# Patient Record
Sex: FEMALE | Race: WHITE | NOT HISPANIC OR LATINO | Employment: OTHER | URBAN - METROPOLITAN AREA
[De-identification: names, ages, dates, MRNs, and addresses within clinical notes are randomized per-mention and may not be internally consistent; named-entity substitution may affect disease eponyms.]

---

## 2018-01-09 ENCOUNTER — INFUSION (OUTPATIENT)
Dept: INFUSION THERAPY | Facility: HOSPITAL | Age: 63
End: 2018-01-09
Attending: OBSTETRICS & GYNECOLOGY
Payer: MEDICARE

## 2018-01-09 VITALS
RESPIRATION RATE: 16 BRPM | SYSTOLIC BLOOD PRESSURE: 133 MMHG | DIASTOLIC BLOOD PRESSURE: 91 MMHG | OXYGEN SATURATION: 99 % | HEART RATE: 74 BPM | TEMPERATURE: 98 F

## 2018-01-09 DIAGNOSIS — C56.9 MALIGNANT NEOPLASM OF OVARY, UNSPECIFIED LATERALITY: Primary | ICD-10-CM

## 2018-01-09 LAB
ALBUMIN SERPL BCP-MCNC: 4.3 G/DL
ALP SERPL-CCNC: 94 U/L
ALT SERPL W/O P-5'-P-CCNC: 32 U/L
ANION GAP SERPL CALC-SCNC: 14 MMOL/L
AST SERPL-CCNC: 27 U/L
BASOPHILS # BLD AUTO: 0.04 K/UL
BASOPHILS NFR BLD: 0.6 %
BILIRUB SERPL-MCNC: 0.6 MG/DL
BUN SERPL-MCNC: 12 MG/DL
CALCIUM SERPL-MCNC: 9.1 MG/DL
CANCER AG125 SERPL-ACNC: 5430 U/ML
CHLORIDE SERPL-SCNC: 98 MMOL/L
CO2 SERPL-SCNC: 27 MMOL/L
CREAT SERPL-MCNC: 0.67 MG/DL
DIFFERENTIAL METHOD: ABNORMAL
EOSINOPHIL # BLD AUTO: 0.1 K/UL
EOSINOPHIL NFR BLD: 1.5 %
ERYTHROCYTE [DISTWIDTH] IN BLOOD BY AUTOMATED COUNT: 13.3 %
EST. GFR  (AFRICAN AMERICAN): >60 ML/MIN/1.73 M^2
EST. GFR  (NON AFRICAN AMERICAN): >60 ML/MIN/1.73 M^2
GLUCOSE SERPL-MCNC: 98 MG/DL
HCT VFR BLD AUTO: 44.5 %
HGB BLD-MCNC: 14.5 G/DL
LYMPHOCYTES # BLD AUTO: 1.2 K/UL
LYMPHOCYTES NFR BLD: 17.5 %
MCH RBC QN AUTO: 30.9 PG
MCHC RBC AUTO-ENTMCNC: 32.6 G/DL
MCV RBC AUTO: 95 FL
MONOCYTES # BLD AUTO: 0.6 K/UL
MONOCYTES NFR BLD: 9.1 %
NEUTROPHILS # BLD AUTO: 4.8 K/UL
NEUTROPHILS NFR BLD: 71.3 %
NRBC BLD-RTO: 0 /100 WBC
PLATELET # BLD AUTO: 251 K/UL
PMV BLD AUTO: 9.8 FL
POTASSIUM SERPL-SCNC: 3.7 MMOL/L
PROT SERPL-MCNC: 7.4 G/DL
RBC # BLD AUTO: 4.69 M/UL
SODIUM SERPL-SCNC: 139 MMOL/L
WBC # BLD AUTO: 6.74 K/UL

## 2018-01-09 PROCEDURE — 80053 COMPREHEN METABOLIC PANEL: CPT

## 2018-01-09 PROCEDURE — 85025 COMPLETE CBC W/AUTO DIFF WBC: CPT | Mod: PN

## 2018-01-09 PROCEDURE — 85025 COMPLETE CBC W/AUTO DIFF WBC: CPT

## 2018-01-09 PROCEDURE — 86304 IMMUNOASSAY TUMOR CA 125: CPT

## 2018-01-09 PROCEDURE — 96523 IRRIG DRUG DELIVERY DEVICE: CPT | Mod: PN

## 2018-01-09 PROCEDURE — 25000003 PHARM REV CODE 250: Mod: PN | Performed by: OBSTETRICS & GYNECOLOGY

## 2018-01-09 PROCEDURE — A4216 STERILE WATER/SALINE, 10 ML: HCPCS | Mod: PN | Performed by: OBSTETRICS & GYNECOLOGY

## 2018-01-09 PROCEDURE — 86304 IMMUNOASSAY TUMOR CA 125: CPT | Mod: PN

## 2018-01-09 PROCEDURE — 80053 COMPREHEN METABOLIC PANEL: CPT | Mod: PN

## 2018-01-09 RX ORDER — SODIUM CHLORIDE 0.9 % (FLUSH) 0.9 %
10 SYRINGE (ML) INJECTION
Status: DISCONTINUED | OUTPATIENT
Start: 2018-01-09 | End: 2018-01-09 | Stop reason: HOSPADM

## 2018-01-09 RX ADMIN — SODIUM CHLORIDE, PRESERVATIVE FREE 10 ML: 5 INJECTION INTRAVENOUS at 04:01

## 2018-01-11 NOTE — PROCEDURES
Procedures by Abbi Lyons RN at  9/15/2016  4:12 PM      Author:  Abbi Lyons RN Service:  Oncology-Medical Author Type:  Registered Nurse     Filed:  9/15/2016  4:18 PM Date of Service:  9/15/2016  4:12 PM Status:  Signed     :  Abbi Lyons RN (Registered Nurse)         Procedure Orders:       1  Insert PICC line [id number] ordered by Hamida Antunez MD at 09/15/16 1031                    Insert PICC line  Date/Time: 9/15/2016 4:14 PM  Performed by: Enma Duncan by: Tyson Brantley     Patient location:  Bedside  Other Assisting Provider:  No    Consent:     Consent obtained:  Written    Consent given by:  Patient    Procedural risks discussed: consent obtained by physician  [place] protocol:     Procedure explained and questions answered to patient or proxy's satisfaction: yes      Relevant documents present and verified: yes      Test results available and properly labeled: yes       Imaging studies available: N/A  Required blood products, implants, devices, and special equipment available: yes      Site/side marked: yes      Immediately prior to procedure, a time out was called: yes       Patient identity confirmed:  Verbally with patient, arm band, provided demographic data and hospital-assigned identification number  Pre-procedure details:     Hand hygiene: Hand hygiene performed prior to insertion      Sterile barrier technique: All elements of maximal sterile technique followed      Skin preparation:  ChloraPrep    Skin preparation agent: Skin preparation agent completely dried prior to procedure    Indications:     PICC line indications: total parenteral nutrition    Anesthesia (see MAR for exact dosages):      Anesthesia method:  Local infiltration    Local anesthetic:  Lidocaine 1% w/o epi  Procedure details:     Location:  Basilic    Vessel type: vein      Laterality:  Right    Approach: percutaneous technique used      Patient position:  Flat    Procedural supplies:  Double lumen    Catheter size:  5 Fr    Landmarks identified: yes      Ultrasound guidance: yes      Sterile ultrasound techniques: Sterile gel and sterile probe covers were used      Number of attempts:  1    Successful placement: yes      Vessel of catheter tip end:  SVC    Total catheter length (cm):  42    Catheter out on skin (cm):  0    Max flow rate:  300ml/min    Arm circumference:  33cm  Post-procedure details:     Post-procedure:  Dressing applied and securement device placed (CHG dressing)    Assessment:  Blood return through all ports and free fluid flow ([place] 3CG)    Post-procedure complications: none      Patient tolerance of procedure:   Tolerated well, no immediate complications                     Received for:Provider  [facility]   Sep 15 2016  4:18PM [facility] Standard Time

## 2018-02-01 ENCOUNTER — SOCIAL WORK (OUTPATIENT)
Dept: HEMATOLOGY/ONCOLOGY | Facility: CLINIC | Age: 63
End: 2018-02-01

## 2018-02-01 ENCOUNTER — DOCUMENTATION ONLY (OUTPATIENT)
Dept: RADIATION ONCOLOGY | Facility: CLINIC | Age: 63
End: 2018-02-01

## 2018-02-01 ENCOUNTER — INITIAL CONSULT (OUTPATIENT)
Dept: RADIATION ONCOLOGY | Facility: CLINIC | Age: 63
End: 2018-02-01
Payer: MEDICARE

## 2018-02-01 VITALS
HEIGHT: 64 IN | WEIGHT: 201 LBS | HEART RATE: 91 BPM | DIASTOLIC BLOOD PRESSURE: 86 MMHG | TEMPERATURE: 98 F | SYSTOLIC BLOOD PRESSURE: 125 MMHG | RESPIRATION RATE: 18 BRPM | BODY MASS INDEX: 34.31 KG/M2

## 2018-02-01 DIAGNOSIS — C56.9 MALIGNANT NEOPLASM OF OVARY, UNSPECIFIED LATERALITY: Primary | ICD-10-CM

## 2018-02-01 PROCEDURE — 99205 OFFICE O/P NEW HI 60 MIN: CPT | Mod: ,,, | Performed by: RADIOLOGY

## 2018-02-01 RX ORDER — ONDANSETRON 4 MG/1
8 TABLET, ORALLY DISINTEGRATING ORAL
COMMUNITY
End: 2018-02-12 | Stop reason: SDUPTHER

## 2018-02-01 RX ORDER — HYDROCODONE BITARTRATE AND ACETAMINOPHEN 5; 325 MG/1; MG/1
1 TABLET ORAL EVERY 6 HOURS PRN
COMMUNITY

## 2018-02-01 RX ORDER — TRAMADOL HYDROCHLORIDE 50 MG/1
50 TABLET ORAL EVERY 6 HOURS PRN
COMMUNITY

## 2018-02-01 NOTE — PROGRESS NOTES
Met with patient and her  to discuss her distress rating of 10.  She stated that she was diagnosed in 2000 and has been dealing with this since then.  She suffers from pain and fatigue which impacts her ability to participate in various activities.  She does not want to pursue supportive services at this time; I provided her with my contact information in the event she changes her mind.  She was provided a flyer with the community events provided at [facility] and encouraged to attend when she feels up to it.

## 2018-02-01 NOTE — PROGRESS NOTES
Bri Kemp  [id number]  1955 2/1/2018  No referring provider defined for this encounter.    DIAGNOSIS: metastatic ovarian cancer  TREATMENT SITE(S):   1. R lateral abdominal wall  2. Entire length vagina    INTENT: PALLIATIVE    TREATMENT SETTING: RT ALONE     MODALITY: PHOTON    TECHNIQUE:  3D CONFORMAL RADIOTHERAPY (3DCRT) with DIODES    IMRT MEDICAL NECESSITY: N/A    I have personally performed treatment planning for the patient, reviewing relevant history/physical and imaging. I have defined GTV, CTV, PTV and organs at risk.     In order to accomplish this plan, I am ordering:  SIMULATION: CT SIMULATION FOR PLACEMENT OF TREATMENT FIELDS    CONTRAST: none    TO ACCOMPLISH REPRODUCIBLE POSITION: VACLOC and FULL BLADDER    DEVICES FOR BEAM SHAPING: CUSTOMIZED MLC    CUSTOMIZED BOLUS: none    IMAGING: DAILY KV/KV OBI    I have ordered a weekly physics check.    SPECIAL PHYSICS CONSULT: NO  REASON: N/A    SPECIAL TREATMENT CIRCUMSTANCE: NO  Concurrent or recent administration of chemotherapeutic agents which are known potent radiosensitizers and thus will require vigilant monitoring for exaggerated radiation toxicities.    LABS: NONE    ANTICIPATED PRESCRIPTION TO ISOCENTER: 30Gy/10frx to both sites    ENERGY: 6/23X    TREATMENT: DAILY    PHYSICIAN: Anuj Kramer Jr, MD

## 2018-02-02 ENCOUNTER — OFFICE VISIT (OUTPATIENT)
Dept: RADIATION ONCOLOGY | Facility: CLINIC | Age: 63
End: 2018-02-02
Payer: MEDICARE

## 2018-02-02 PROCEDURE — 77334 RADIATION TREATMENT AID(S): CPT | Mod: ,,, | Performed by: RADIOLOGY

## 2018-02-02 PROCEDURE — 77290 THER RAD SIMULAJ FIELD CPLX: CPT | Mod: ,,, | Performed by: RADIOLOGY

## 2018-02-05 PROCEDURE — 77334 RADIATION TREATMENT AID(S): CPT | Mod: ,,, | Performed by: RADIOLOGY

## 2018-02-05 PROCEDURE — 77263 THER RADIOLOGY TX PLNG CPLX: CPT | Mod: ,,, | Performed by: RADIOLOGY

## 2018-02-05 PROCEDURE — 77295 3-D RADIOTHERAPY PLAN: CPT | Mod: ,,, | Performed by: RADIOLOGY

## 2018-02-05 PROCEDURE — 77300 RADIATION THERAPY DOSE PLAN: CPT | Mod: ,,, | Performed by: RADIOLOGY

## 2018-02-07 DIAGNOSIS — C56.9 MALIGNANT NEOPLASM OF OVARY, UNSPECIFIED LATERALITY: Primary | ICD-10-CM

## 2018-02-07 PROCEDURE — 77427 RADIATION TX MANAGEMENT X5: CPT | Mod: ,,, | Performed by: RADIOLOGY

## 2018-02-07 RX ORDER — HYDROCODONE BITARTRATE AND ACETAMINOPHEN 5; 325 MG/1; MG/1
1 TABLET ORAL
Qty: 60 TABLET | Refills: 0 | Status: SHIPPED | OUTPATIENT
Start: 2018-02-07

## 2018-02-12 ENCOUNTER — TREATMENT (OUTPATIENT)
Dept: RADIATION ONCOLOGY | Facility: CLINIC | Age: 63
End: 2018-02-12
Payer: MEDICARE

## 2018-02-12 ENCOUNTER — DOCUMENTATION ONLY (OUTPATIENT)
Dept: RADIATION ONCOLOGY | Facility: CLINIC | Age: 63
End: 2018-02-12

## 2018-02-12 DIAGNOSIS — C56.9 MALIGNANT NEOPLASM OF OVARY, UNSPECIFIED LATERALITY: Primary | ICD-10-CM

## 2018-02-12 PROCEDURE — G6002 STEREOSCOPIC X-RAY GUIDANCE: HCPCS | Mod: ,,, | Performed by: RADIOLOGY

## 2018-02-12 PROCEDURE — G6014 RADIATION TREATMENT DELIVERY: HCPCS | Mod: ,,, | Performed by: RADIOLOGY

## 2018-02-12 RX ORDER — ONDANSETRON 4 MG/1
8 TABLET, ORALLY DISINTEGRATING ORAL EVERY 8 HOURS PRN
Qty: 90 TABLET | Refills: 2 | Status: SHIPPED | OUTPATIENT
Start: 2018-02-12

## 2018-02-12 NOTE — PROGRESS NOTES
NUTRITION NOTE    Bri is a 59 year old female with ovarian cancer.  She has history of  obstructions and has a colostomy.  I was asked to see her because she is constipated.  Has not had a bowel movement for 3-4 days.  She quit taking colace because she thought she would get diarrhea induced by radiation.  She states she is eating very little due to pain and nausea.  Plan: With Dr. Kramer's blessing, I advised her to take 2 Senokot-S tablets and resume her colace. 2. Advised to increase fluid intake. 3. Will need to follow a liquid diet until she is able to have a BM.  Then advance to low fiber diet. 4.. I will provide her with copy of the low fiber food choices for bowel obstruction.  She may need to be on liquid diet from now on.

## 2018-02-15 PROCEDURE — 77427 RADIATION TX MANAGEMENT X5: CPT | Mod: ,,, | Performed by: RADIOLOGY

## 2018-02-19 ENCOUNTER — TREATMENT (OUTPATIENT)
Dept: RADIATION ONCOLOGY | Facility: CLINIC | Age: 63
End: 2018-02-19
Payer: MEDICARE

## 2018-02-19 ENCOUNTER — DOCUMENTATION ONLY (OUTPATIENT)
Dept: RADIATION ONCOLOGY | Facility: CLINIC | Age: 63
End: 2018-02-19

## 2018-02-19 PROCEDURE — G6002 STEREOSCOPIC X-RAY GUIDANCE: HCPCS | Mod: ,,, | Performed by: RADIOLOGY

## 2018-02-19 PROCEDURE — G6014 RADIATION TREATMENT DELIVERY: HCPCS | Mod: ,,, | Performed by: RADIOLOGY

## 2018-02-19 NOTE — PROGRESS NOTES
NUTRITION NOTE   came to my office to let me know that Bri is not keeping anything down, is nauseated and is not eating.  She has little output in her ostomy bag.  Plan: Advised that she see Dr. Kramer today, for his advice as to whether she should go to ER to rule out bowel obstruction.

## 2018-02-22 ENCOUNTER — TREATMENT (OUTPATIENT)
Dept: RADIATION ONCOLOGY | Facility: CLINIC | Age: 63
End: 2018-02-22
Payer: MEDICARE

## 2018-02-22 PROCEDURE — 77336 RADIATION PHYSICS CONSULT: CPT | Mod: ,,, | Performed by: RADIOLOGY

## 2018-02-22 PROCEDURE — G6002 STEREOSCOPIC X-RAY GUIDANCE: HCPCS | Mod: ,,, | Performed by: RADIOLOGY

## 2018-02-22 PROCEDURE — G6014 RADIATION TREATMENT DELIVERY: HCPCS | Mod: ,,, | Performed by: RADIOLOGY

## 2018-03-14 ENCOUNTER — DOCUMENTATION ONLY (OUTPATIENT)
Dept: RADIATION ONCOLOGY | Facility: CLINIC | Age: 63
End: 2018-03-14

## 2018-03-14 ENCOUNTER — OFFICE VISIT (OUTPATIENT)
Dept: RADIATION ONCOLOGY | Facility: CLINIC | Age: 63
End: 2018-03-14
Payer: MEDICARE

## 2018-03-14 VITALS — WEIGHT: 180 LBS | BODY MASS INDEX: 30.9 KG/M2

## 2018-03-14 DIAGNOSIS — C56.9 MALIGNANT NEOPLASM OF OVARY, UNSPECIFIED LATERALITY: Primary | ICD-10-CM

## 2018-03-14 PROCEDURE — 77370 RADIATION PHYSICS CONSULT: CPT | Mod: ,,, | Performed by: RADIOLOGY

## 2018-03-14 PROCEDURE — 99024 POSTOP FOLLOW-UP VISIT: CPT | Mod: ,,, | Performed by: RADIOLOGY

## 2018-03-14 PROCEDURE — 77263 THER RADIOLOGY TX PLNG CPLX: CPT | Mod: ,,, | Performed by: RADIOLOGY

## 2018-03-14 RX ORDER — ONDANSETRON 8 MG/1
TABLET, ORALLY DISINTEGRATING ORAL
Refills: 0 | COMMUNITY
Start: 2018-01-30

## 2018-03-14 NOTE — PROGRESS NOTES
Bri Kemp  [id number]  1955  3/14/2018  No referring provider defined for this encounter.    DIAGNOSIS: Metastatic serous carcinoma of ovary  TREATMENT SITE(S): RLQ abdomen    INTENT: PALLIATIVE    TREATMENT SETTING: RT ALONE     MODALITY: PHOTON    TECHNIQUE:  3D CONFORMAL RADIOTHERAPY (3DCRT) with DIODES    IMRT MEDICAL NECESSITY: N/A    I have personally performed treatment planning for the patient, reviewing relevant history/physical and imaging. I have defined GTV, CTV, PTV and organs at risk.     In order to accomplish this plan, I am ordering:  SIMULATION: CT SIMULATION FOR PLACEMENT OF TREATMENT FIELDS    CONTRAST: none    TO ACCOMPLISH REPRODUCIBLE POSITION: [employer]    DEVICES FOR BEAM SHAPING: CUSTOMIZED MLC    CUSTOMIZED BOLUS: 1CM DAILY    IMAGING: DAILY KV/KV OBI    I have ordered a weekly physics check.    SPECIAL PHYSICS CONSULT: YES  REASON: NEAR PRIOR FIELD    SPECIAL TREATMENT CIRCUMSTANCE: NO  Concurrent or recent administration of chemotherapeutic agents which are known potent radiosensitizers and thus will require vigilant monitoring for exaggerated radiation toxicities.    LABS: NONE    ANTICIPATED PRESCRIPTION TO ISOCENTER: 30Gy/10frx    ENERGY: 6/23X    TREATMENT: DAILY    PHYSICIAN: Anuj Kramer Jr, MD

## 2018-03-14 NOTE — PROGRESS NOTES
Bri Kemp  [id number]  1955  3/14/2018  No referring provider defined for this encounter.    DIAGNOSIS: Metastatic serous ovarian carcinoma  REASON FOR VISIT: Routine scheduled follow-up.    HISTORY OF PRESENT ILLNESS:   62Fwith history of recurrent ovarian cancer, last chemotherapy was Gemzar weekly (prior, carboplatin 2017)  PMHx: ex-lap + ostomy (2016)    *9/00 ADIA/BSO and LND, bx   - right fallopian tube and ovary with serous borderline tumor with micro papillary features   - left fallopian tube and ovary with serous borderline tumor with micropapillary features   - Uterus with noninvasive implant of serous borderline tumor, leiomyomata   - Cul-de-sac with noninvasive implants of serous borderline tumor   - Right pelvic sidewall noninvasive desmoplastic implants of serous borderline tumor   - Left pelvic sidewall with minute fragments of serous portal line tumor as well as noninvasive implants   - Left pelvic lymph node dissection 0/5 positive; left para-aortic lymph node 0/1 positive   - Right pelvic lymph node dissection 0/7 positive; right para-aortic 0/1 positive   - rectosigmoid mesentery noninvasive implant of serous borderline tumor   - Omentum with noninvasive desmoplastic implant of serous borderline tumor   - Right and left paracolic gutter biopsies are benign   - Right and left diaphragm washing with serous borderline tumor   - pelvic fluid positive for serous borderline tumor   - Cholecystectomy revealing cholelithiasis with chronic cholecystitis   - Mesenteric implant positive for low serous carcinoma; +ERBB2, MLL2 mutations per [facility]  *[facility]   *1/18 CT C  -Bilateral pleural effusions larger on the right and small on the left.  -Changes suspicious for pleural metastasis.  -Enlarged mediastinal lymph nodes and enlarged nodes in the right exit left.  -Several faint areas in the right lung this could be inflammatory cannot rule out early metastatic disease.  *1/18 CT  AP  -Large right-sided pleural effusion.  -Soft tissue density partially calcified in the right lateral abdominal wall. Cannot rule out metastatic disease.  -Increased attenuation in the subcutaneous tissue on the right with thickening of the skin over the pelvis suspicious for cellulitis.  -Ostomies as described above.  -Small nodes in the base of the mesentery in the pelvis. Suspicious for metastatic disease.    *1/18 Dr. Cheung: pt in significant pain and discomfort; refer [facility] for palliative RT    Ca-125: 2507    Patient completed palliative radiotherapy to the right abdominal wall into the vaginal disease, 30 gray in 10 fractions in February 2018. Treatment was well-tolerated however her KPS continued to decline.    INTERVAL HISTORY:   Since completion of therapy patient reports significant pain relief in the right abdominal wall as well as in the vagina permitting comfort on sitting. Unfortunately her disease continues to progress she now has evidence of significant effusion on the right side and has been hospitalized for pain control and uncontrollable nausea. She has met with Dr. Cheung who recommended hospice care and is asking me to consider palliative radiotherapy to the right lower quadrant skin disease prior to that.    Review of Systems   Constitutional: Positive for appetite change, fatigue and unexpected weight change. Negative for chills and fever.   HENT:   Negative for lump/mass, mouth sores, sore throat, trouble swallowing and voice change.    Eyes: Negative for eye problems and icterus.   Respiratory: Positive for shortness of breath. Negative for cough and hemoptysis.    Cardiovascular: Negative for chest pain and leg swelling.   Gastrointestinal: Positive for abdominal pain, constipation, diarrhea, nausea and vomiting.   Genitourinary: Positive for pelvic pain. Negative for dysuria, frequency, hematuria, nocturia and vaginal bleeding.    Musculoskeletal: Positive for back pain. Negative for  gait problem, neck pain and neck stiffness.   Skin: Positive for itching.   Neurological: Negative for extremity weakness, gait problem, headaches, numbness and seizures.   Hematological: Negative for adenopathy.     Past Medical History:   Diagnosis Date    Ovarian cancer      Past Surgical History:   Procedure Laterality Date    HYSTERECTOMY       Social History     Social History    Marital status:      Spouse name: N/A    Number of children: N/A    Years of education: N/A     Social History Main Topics    Smoking status: Never Smoker    Smokeless tobacco: Never Used    Alcohol use No    Drug use: No    Sexual activity: Not Asked     Other Topics Concern    None     Social History Narrative    None     No family history on file.  Medication List with Changes/Refills   Current Medications    HYDROCODONE-ACETAMINOPHEN 5-325MG (NORCO) 5-325 MG PER TABLET    Take 1 tablet by mouth every 6 (six) hours as needed for Pain.    HYDROCODONE-ACETAMINOPHEN 5-325MG (NORCO) 5-325 MG PER TABLET    Take 1 tablet by mouth every 4 to 6 hours as needed for Pain.    ONDANSETRON (ZOFRAN-ODT) 4 MG TBDL    Take 2 tablets (8 mg total) by mouth every 8 (eight) hours as needed.    ONDANSETRON (ZOFRAN-ODT) 8 MG TBDL    DISSOLVE ONE TABLET ON THE TONGUE EVERY 6-8 HOURS AS NEEDED FOR NAUSEA OR VOMITING.    TRAMADOL (ULTRAM) 50 MG TABLET    Take 50 mg by mouth every 6 (six) hours as needed for Pain.     Review of patient's allergies indicates:   Allergen Reactions    Clindamycin Itching       QUALITY OF LIFE: 70%- Cares for Self: Unable to Carry on Normal Activity or Active Work    Vitals:    03/14/18 1134   Weight: 81.6 kg (180 lb)       PHYSICAL EXAM:   GENERAL: alert; in no apparent distress.   HEAD: normocephalic, atraumatic.  EYES: pupils are equal, round, reactive to light and accommodation. Sclera anicteric. Conjunctiva not injected.   NOSE/THROAT: no nasal erythema or rhinorrhea. Oropharynx pink, without erythema,  ulcerations or thrush.   NECK: no cervical motion rigidity; supple with no masses.  CHEST: Decreased breath sounds in the right base and patient is better breathing lying flat.  ABDOMEN: Tenderness to deep palpation of the right abdominal wall previous he treated. Examination of the right lower quadrant reveals ecchymoses and nodularity without any skin ulceration approximate 10 cm involvement consistent with metastatic disease in the subcutaneous tissue.  MUSCULOSKELETAL: no tenderness to palpation along the spine or scapulae. Normal range of motion.  NEUROLOGIC: cranial nerves II-XII intact bilaterally. Strength 5/5 in bilateral upper and lower extremities. No sensory deficits appreciated. Normal gait.   EXTREMITIES: no clubbing, cyanosis, edema.  SKIN: no erythema, rashes, ulcerations noted.     ANCILLARY DATA: none    ASSESSMENT: 63F with metastatic serous ovarian carcinoma status post palliative radiotherapy to the right upper quadrant abdominal wall and vagina to 30 gray with good pain relief ending February 2018 with a new site of disease in the right lower quadrant causing pain, now considering hospice.  PLAN:  Mrs. Kemp continues to have a progressive decline in her KPS and per Dr. Cheung has no good options for systemic therapy for her metastatic serous ovarian carcinoma. They are presently considering hospice care. She previously got significant pain relief from the right abdominal wall and vagina is presenting today with a significant tumor involvement in the subcutaneous tissues of the right lower quadrant of her abdomen. This is not yet ulcerated through this will occur within the month per my assessment. Given the morbidity associated with the circumstance and her previous response to palliative radiotherapy, I would recommend a course of 30 gray in 10 fractions with customize skin bolusing to palliate this progressive site. I am fearful that without treatment this will become a large ulcerated wound  at risk of infection which would certainly compromise her quality of life if not her quantity of life. She will proceed to hospice care following this. At today's visit I ensured she had adequate refills of all her medications I would like to proceed with CT simulation tomorrow with the intention to begin therapy on March 19. The patient and her  voiced understanding and seem to be developing increased insight to the circumstances. She would like to proceed.  We discussed the risks and benefits of the above treatment and have gone over in detail the acute and late toxicities of radiation therapy to the RLQ abdomen/pelvis. The patient expressed  understanding and has signed a consent form which is included in the patients chart. The patient has our contact information and understands that they are free to contact us at any time with questions or concerns regarding radiation therapy.  All questions answered and contact information provided. Patient understands free to call us anytime with any questions or concerns regarding radiation therapy.    TIME SPENT WITH PATIENT: I have personally seen and evaluated this patient. Approximately 30 minutes were spent with the patient discussing follow-up careplan.     PHYSICIAN: Anuj Kramer Jr, MD

## 2018-03-15 ENCOUNTER — TREATMENT (OUTPATIENT)
Dept: RADIATION ONCOLOGY | Facility: CLINIC | Age: 63
End: 2018-03-15
Payer: MEDICARE

## 2018-03-15 PROCEDURE — 77334 RADIATION TREATMENT AID(S): CPT | Mod: ,,, | Performed by: RADIOLOGY

## 2018-03-15 PROCEDURE — 77290 THER RAD SIMULAJ FIELD CPLX: CPT | Mod: ,,, | Performed by: RADIOLOGY

## 2018-03-16 PROCEDURE — 77300 RADIATION THERAPY DOSE PLAN: CPT | Mod: ,,, | Performed by: RADIOLOGY

## 2018-03-16 PROCEDURE — 77334 RADIATION TREATMENT AID(S): CPT | Mod: ,,, | Performed by: RADIOLOGY

## 2018-03-16 PROCEDURE — 77295 3-D RADIOTHERAPY PLAN: CPT | Mod: ,,, | Performed by: RADIOLOGY

## 2018-03-20 PROCEDURE — 77427 RADIATION TX MANAGEMENT X5: CPT | Mod: ,,, | Performed by: RADIOLOGY

## 2018-03-21 ENCOUNTER — TREATMENT (OUTPATIENT)
Dept: RADIATION ONCOLOGY | Facility: CLINIC | Age: 63
End: 2018-03-21
Payer: MEDICARE

## 2018-03-21 PROCEDURE — 77331 SPECIAL RADIATION DOSIMETRY: CPT | Mod: ,,, | Performed by: RADIOLOGY

## 2018-03-21 PROCEDURE — G6002 STEREOSCOPIC X-RAY GUIDANCE: HCPCS | Mod: ,,, | Performed by: RADIOLOGY

## 2018-03-21 PROCEDURE — G6014 RADIATION TREATMENT DELIVERY: HCPCS | Mod: ,,, | Performed by: RADIOLOGY

## 2018-03-28 PROCEDURE — 77427 RADIATION TX MANAGEMENT X5: CPT | Mod: ,,, | Performed by: RADIOLOGY

## 2018-04-02 ENCOUNTER — TREATMENT (OUTPATIENT)
Dept: RADIATION ONCOLOGY | Facility: CLINIC | Age: 63
End: 2018-04-02
Payer: MEDICARE

## 2018-04-02 PROCEDURE — G6014 RADIATION TREATMENT DELIVERY: HCPCS | Mod: ,,, | Performed by: RADIOLOGY

## 2018-04-02 PROCEDURE — G6002 STEREOSCOPIC X-RAY GUIDANCE: HCPCS | Mod: ,,, | Performed by: RADIOLOGY

## 2023-07-03 NOTE — PROGRESS NOTES
Bri Justo  [id number]  1955 2/1/2018  Rissa Cheung Md  [address] [place] [address]    REASON FOR CONSULTATION: Metastatic serous ovarian carcinoma  TREATMENT GOAL: palliative    HISTORY OF PRESENT ILLNESS:   62Fwith history of recurrent ovarian cancer, last chemotherapy was Gemzar weekly (prior, carboplatin 2017)  PMHx: ex-lap + ostomy (2016)    *9/00 ADIA/BSO and LND, bx   - right fallopian tube and ovary with serous borderline tumor with micro papillary features   - left fallopian tube and ovary with serous borderline tumor with micropapillary features   - Uterus with noninvasive implant of serous borderline tumor, leiomyomata   - Cul-de-sac with noninvasive implants of serous borderline tumor   - Right pelvic sidewall noninvasive desmoplastic implants of serous borderline tumor   - Left pelvic sidewall with minute fragments of serous portal line tumor as well as noninvasive implants   - Left pelvic lymph node dissection 0/5 positive; left para-aortic lymph node 0/1 positive   - Right pelvic lymph node dissection 0/7 positive; right para-aortic 0/1 positive   - rectosigmoid mesentery noninvasive implant of serous borderline tumor   - Omentum with noninvasive desmoplastic implant of serous borderline tumor   - Right and left paracolic gutter biopsies are benign   - Right and left diaphragm washing with serous borderline tumor   - pelvic fluid positive for serous borderline tumor   - Cholecystectomy revealing cholelithiasis with chronic cholecystitis   - Mesenteric implant positive for low serous carcinoma; +ERBB2, MLL2 mutations per [facility]  *10/16 Mesenteric b @ [facility]:   *1/18 CT C  -Bilateral pleural effusions larger on the right and small on the left.  -Changes suspicious for pleural metastasis.  -Enlarged mediastinal lymph nodes and enlarged nodes in the right exit left.  -Several faint areas in the right lung this could be inflammatory cannot rule out early metastatic disease.  *1/18 CT  normal appearance ,  AP  -Large right-sided pleural effusion.  -Soft tissue density partially calcified in the right lateral abdominal wall. Cannot rule out metastatic disease.  -Increased attenuation in the subcutaneous tissue on the right with thickening of the skin over the pelvis suspicious for cellulitis.  -Ostomies as described above.  -Small nodes in the base of the mesentery in the pelvis. Suspicious for metastatic disease.    *1/18 Dr. Cheung: pt in significant pain and discomfort; refer [facility] for palliative RT    Ca-125: 2507    At today's visit patient is endorsing right lateral abdominal wall pain sometimes restrict movement of her right upper extremity. As well, she is endorsing discomfort while seating and occasional vaginal discharge. She does have frequency and occasional incontinence of urine but presently denies dysuria or hematuria. She denies bone pain, fevers chills chest pain or shortness of breath.    Review of Systems   Constitutional: Positive for fatigue. Negative for appetite change, chills, fever and unexpected weight change.   HENT:   Negative for lump/mass, mouth sores, sore throat, trouble swallowing and voice change.    Eyes: Negative for eye problems and icterus.   Respiratory: Negative for cough, hemoptysis and shortness of breath.    Cardiovascular: Negative for chest pain and leg swelling.   Gastrointestinal: Positive for abdominal pain. Negative for constipation, diarrhea, nausea and vomiting.   Genitourinary: Positive for frequency, pelvic pain and vaginal discharge. Negative for dysuria, hematuria, nocturia and vaginal bleeding.    Musculoskeletal: Negative for back pain, gait problem, neck pain and neck stiffness.   Neurological: Negative for extremity weakness, gait problem, headaches, numbness and seizures.   Hematological: Negative for adenopathy.     Past Medical History:   Diagnosis Date    Ovarian cancer      Past Surgical History:   Procedure Laterality Date    HYSTERECTOMY       Social  "History     Social History    Marital status:      Spouse name: N/A    Number of children: N/A    Years of education: N/A     Social History Main Topics    Smoking status: Never Smoker    Smokeless tobacco: Never Used    Alcohol use No    Drug use: No    Sexual activity: Not Asked     Other Topics Concern    None     Social History Narrative    None     History reviewed. No pertinent family history.    PRIOR HISTORY OF CHEMOTHERAPY OR RADIOTHERAPY: Please see HPI for patients prior oncologic history.    Medication List with Changes/Refills   Current Medications    HYDROCODONE-ACETAMINOPHEN 5-325MG (NORCO) 5-325 MG PER TABLET    Take 1 tablet by mouth every 6 (six) hours as needed for Pain.    ONDANSETRON (ZOFRAN-ODT) 4 MG TBDL    Take 8 mg by mouth every 12 (twelve) hours.    TRAMADOL (ULTRAM) 50 MG TABLET    Take 50 mg by mouth every 6 (six) hours as needed for Pain.     Review of patient's allergies indicates:   Allergen Reactions    Clindamycin Itching       QUALITY OF LIFE: 90%- Able to Carry on Normal Activity: Minor Symptoms of Disease    Vitals:    02/01/18 1118   BP: 125/86   Pulse: 91   Resp: 18   Temp: 98.3 °F (36.8 °C)   TempSrc: Oral   Weight: 91.2 kg (201 lb)   Height: 5' 4" (1.626 m)   PainSc: 0-No pain       PHYSICAL EXAM:   GENERAL: alert; Uncomfortable  HEAD: normocephalic, atraumatic.  EYES: pupils are equal, round, reactive to light and accommodation. Sclera anicteric. Conjunctiva not injected.   NOSE/THROAT: no nasal erythema or rhinorrhea. Oropharynx pink, without erythema, ulcerations or thrush.   NECK: no cervical motion rigidity; supple with no masses.  CHEST: ackles or rubs. Patient is speaking comfortably on room air with normal work of breathing without using accessory muscles of respiration.  ABDOMEN: soft, nontender, nondistended. Bowel sounds present. TTP RUQ laterally; ostomy  MUSCULOSKELETAL: no tenderness to palpation along the spine or scapulae.   NEUROLOGIC: " cranial nerves II-XII intact bilaterally. Strength 5/5 in bilateral upper and lower extremities. No sensory deficits appreciated. Normal gait.  EXTREMITIES: no clubbing, cyanosis, edema.  SKIN: no erythema, rashes, ulcerations noted.   PELVIC: Circumferential cobblestoning of the vaginal vault extending to the introitus; well-tolerated exam; tumor does not appear friable as there is no blood withdrawing finger    REVIEW OF IMAGING/PATHOLOGY/LABS: Please see HPI. All images reviewed personally by dictating physician.     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  PLAN:   Ms. Kemp has a diagnosis of serous carcinoma of the ovary since 2000 during which time she is had surgeries, multiple systemic therapies, but no radiotherapy. She recently presented to Dr. Cheung with difficulty seating and restricted range of motion due to pain in the right abdominal wall. Updated CT of the abdomen and pelvis demonstrates disease in the right lateral abdominal wall is likely responsible for her pain at that site which does require pain medication and is significantly compromising her quality of life. On pelvic examination Dr. Cheung detected the likely source of Mr. Kemp's discomfort sitting she has tumor within the vaginal vault extending down to the introitus. I confirm this on my examination today and was unable to pass a speculum. Patient last received systemic therapy in November of last year and per my review of the charts examination today is a good candidate for palliative radiotherapy. This is to be done with an external beam technique and I'm recommending a dose of 30 gray in 10 fractions to the right abdominal wall metastasis as well as to the entire length of the vagina. These treatments were provided on the same day that the patient will hopefully derive a palliative benefit and could be quickly discharged back to consideration of  systemic therapy. Had a detailed discussion with the patient and her  regarding the risks and benefits of this treatment I suspect that she will have a very good tolerance to therapy and should derive a benefit. She would like to proceed.    We discussed the risks and benefits of the above treatment and have gone over in detail the acute and late toxicities of radiation therapy to the pelvis, abdomen. The patient expressed  understanding and has signed a consent form which is included in the patients chart. The patient has our contact information and understands that they are free to contact us at any time with questions or concerns regarding radiation therapy.    DISPOSITION: RTC FOR CT SIM    TIME SPENT WITH PATIENT: I have personally seen and evaluated this patient. Approximately one hour was spent in consultation with the patient, greater than 50% of this time was spent discussing the personalized oncologic treatment plan and details of radiation therapy with the patient.     PHYSICIAN: Anuj Kramer Jr, MD